# Patient Record
Sex: MALE | Race: OTHER | Employment: FULL TIME | URBAN - METROPOLITAN AREA
[De-identification: names, ages, dates, MRNs, and addresses within clinical notes are randomized per-mention and may not be internally consistent; named-entity substitution may affect disease eponyms.]

---

## 2023-11-30 ENCOUNTER — HOSPITAL ENCOUNTER (EMERGENCY)
Age: 23
Discharge: HOME | End: 2023-11-30
Attending: EMERGENCY MEDICINE

## 2023-11-30 ENCOUNTER — APPOINTMENT (OUTPATIENT)
Dept: GENERAL RADIOLOGY | Age: 23
End: 2023-11-30

## 2023-11-30 ENCOUNTER — APPOINTMENT (OUTPATIENT)
Dept: CT IMAGING | Age: 23
End: 2023-11-30

## 2023-11-30 VITALS
HEART RATE: 78 BPM | OXYGEN SATURATION: 100 % | TEMPERATURE: 99 F | BODY MASS INDEX: 22.87 KG/M2 | WEIGHT: 154.4 LBS | DIASTOLIC BLOOD PRESSURE: 77 MMHG | RESPIRATION RATE: 16 BRPM | HEIGHT: 69 IN | SYSTOLIC BLOOD PRESSURE: 148 MMHG

## 2023-11-30 DIAGNOSIS — S22.088A OTHER CLOSED FRACTURE OF TWELFTH THORACIC VERTEBRA, INITIAL ENCOUNTER (HCC): Primary | ICD-10-CM

## 2023-11-30 DIAGNOSIS — S32.018A OTHER CLOSED FRACTURE OF FIRST LUMBAR VERTEBRA, INITIAL ENCOUNTER (HCC): ICD-10-CM

## 2023-11-30 PROCEDURE — 72131 CT LUMBAR SPINE W/O DYE: CPT

## 2023-11-30 PROCEDURE — 99284 EMERGENCY DEPT VISIT MOD MDM: CPT

## 2023-11-30 RX ORDER — HYDROCODONE BITARTRATE AND ACETAMINOPHEN 5; 325 MG/1; MG/1
1 TABLET ORAL EVERY 8 HOURS PRN
Qty: 12 TABLET | Refills: 0 | Status: SHIPPED | OUTPATIENT
Start: 2023-11-30 | End: 2023-12-05

## 2023-11-30 ASSESSMENT — ENCOUNTER SYMPTOMS
GASTROINTESTINAL NEGATIVE: 1
RESPIRATORY NEGATIVE: 1
BACK PAIN: 1

## 2023-11-30 ASSESSMENT — PAIN - FUNCTIONAL ASSESSMENT: PAIN_FUNCTIONAL_ASSESSMENT: 0-10

## 2023-11-30 ASSESSMENT — PAIN DESCRIPTION - DESCRIPTORS: DESCRIPTORS: ACHING

## 2023-11-30 ASSESSMENT — PAIN SCALES - GENERAL: PAINLEVEL_OUTOF10: 10

## 2023-11-30 ASSESSMENT — PAIN DESCRIPTION - LOCATION: LOCATION: BACK

## 2023-11-30 ASSESSMENT — PAIN DESCRIPTION - ORIENTATION: ORIENTATION: LOWER

## 2023-12-01 NOTE — ED TRIAGE NOTES
Pt ambulatory to triage c/o falling off a roof on Tuesday while he was working. Pt states he fell about 10-20 feet, he hit his head, did not lose consciousness. Pt states he came in today because he back started to hurt. Denies any urinary or bowel changes. Able to walk with even, steady gait.

## 2023-12-01 NOTE — ED PROVIDER NOTES
Emergency Department Provider Note       PCP: No primary care provider on file. Age: 26 y.o. Sex: male     DISPOSITION Discharge - Pending Orders Complete 11/30/2023 08:43:29 PM       ICD-10-CM    1. Other closed fracture of twelfth thoracic vertebra, initial encounter ([address])  K62.060N HYDROcodone-acetaminophen ([address]) 5-325 MG per tablet     [address] (Spine Surgery)      2. Other closed fracture of first lumbar vertebra, initial encounter ([address])  S32.018A HYDROcodone-acetaminophen ([address]) 5-325 MG per tablet     [address] [place] (Spine Surgery)          Medical Decision Making     Complexity of Problems Addressed:  1 or more acute illnesses that pose a threat to life or bodily function. Data Reviewed and Analyzed:  I independently ordered and reviewed each unique test.      I interpreted the CT Scan agree with the radiologist interpretation. Discussion of management or test interpretation. Traumatic fall 10 feet off the roof onto grass. Fell flat on his back. Has had pain since. Has not been seen by anybody. No numbness or tingling. Given mechanism of injury will obtain CT lumbar for assessment. He has no abdominal pain. Low suspicion for dissection, intra-abdominal pathology. 8:46 PM  Continue with fracture of T12 and L1. No neurologic finding. Will place in TLSO spine. Will place on [place] as needed for pain. Recommend follow-up with orthopedics. Return precautions given. No further questions or concerns      Risk of Complications and/or Morbidity of Patient Management:  Prescription drug management performed. Shared medical decision making was utilized in creating the patients health plan today. History       HPI   Fell off roof, about 10 FT. Injury on Tuesday. Hitting the grass. No LOC. No head injury. Lower back pain. Pain with ambulation and movements. No incontinence. No numbness.    Review

## 2023-12-12 ENCOUNTER — OFFICE VISIT (OUTPATIENT)
Dept: ORTHOPEDIC SURGERY | Age: 23
End: 2023-12-12

## 2023-12-12 DIAGNOSIS — S32.010A COMPRESSION FRACTURE OF L1 VERTEBRA, INITIAL ENCOUNTER (HCC): ICD-10-CM

## 2023-12-12 DIAGNOSIS — S22.080A COMPRESSION FRACTURE OF T12 VERTEBRA, INITIAL ENCOUNTER (HCC): Primary | ICD-10-CM

## 2023-12-12 PROCEDURE — 99203 OFFICE O/P NEW LOW 30 MIN: CPT | Performed by: ORTHOPAEDIC SURGERY

## 2023-12-12 RX ORDER — HYDROCODONE BITARTRATE AND ACETAMINOPHEN 5; 325 MG/1; MG/1
1 TABLET ORAL EVERY 6 HOURS PRN
COMMUNITY

## 2023-12-12 NOTE — PROGRESS NOTES
Name: Rafita Durand Sport  YOB: 2000  Gender: male  MRN: [id number]  Age: 26 y.o. Chief Complaint: Back pain    History of Present Illness: This is a very pleasant 26 y.o. male who presents with back pain after a fall that occurred on 11/28/2023. Patient states he fell 10 feet. Went to the ER and CT was done that showed a compression fracture T12-L1. Denies any weakness in his legs, saddle anesthesia, difficulty going to the bathroom. Describes the pain as sharp and constant. Pain is 8/10 in severity. He has been wearing a TLSO which helps with his symptoms. Pain is worse with bending. Medications:       Current Outpatient Medications:     HYDROcodone-acetaminophen (NORCO) 5-325 MG per tablet, Take 1 tablet by mouth every 6 hours as needed for Pain. Max Daily Amount: 4 tablets, Disp: , Rfl:     Allergies:    No Known Allergies      Physical Exam:     This is a well developed well nourished male adult in no acute distress. Mood and affect are appropriate. Oriented to person, place, and time.     Respirations are unlabored and there is no evidence of cyanosis        Sensory testing:     L2 L3 L4 L5 S1 S2   Right 2 2 2 2 2 2   Left 2 2 2 2 2 2     0=absent; 1=altered; 2=normal; NT= not tested  Reflexes    Reflexes   Right Left   Quadriceps (L4) 2 2   Achilles (S1) 2 2     Strength testing in the lower extremity reveals the following based on the 5 point grading scale:     HF  (L2) KE (L3/4) ADF (L4) EHL (L5) APF (S1)   Right 5 5 5 5 5   Left 5 5 5 5 5   0=total paralysis; 1=palpable or visible contraction; 2= active movement with gravity eliminated; 3= active movement against gravity; 4= active movement against moderate resistance; 5= active movement against full resistance         Radiographic Studies:     X-rays including AP and lateral views of the lumbar spine were reviewed and interpreted:     X-rays of the thoracolumbar spine show compression fracture T12 and L1      CT of the

## 2024-01-10 ENCOUNTER — OFFICE VISIT (OUTPATIENT)
Dept: ORTHOPEDIC SURGERY | Age: 24
End: 2024-01-10

## 2024-01-10 DIAGNOSIS — S32.010A COMPRESSION FRACTURE OF L1 VERTEBRA, INITIAL ENCOUNTER (HCC): ICD-10-CM

## 2024-01-10 DIAGNOSIS — S22.080A COMPRESSION FRACTURE OF T12 VERTEBRA, INITIAL ENCOUNTER (HCC): Primary | ICD-10-CM

## 2024-01-10 PROCEDURE — 99213 OFFICE O/P EST LOW 20 MIN: CPT | Performed by: ORTHOPAEDIC SURGERY

## 2024-01-10 NOTE — PROGRESS NOTES
Name: Rafita Reddy  YOB: 2000  Gender: male  MRN: [id number]  Age: 23 y.o.      Chief Complaint: Fracture follow-up    History of Present Illness:      This is a very pleasant 23 y.o. male who presents for fracture follow-up.  Date of injury was November 30, 2023.  Patient has a T12 and L1 compression fracture.  Pain controlled.  Has been wearing his TLSO brace.        Medications:       Current Outpatient Medications:     HYDROcodone-acetaminophen (NORCO) 5-325 MG per tablet, Take 1 tablet by mouth every 6 hours as needed for Pain. Max Daily Amount: 4 tablets, Disp: , Rfl:     Allergies:    No Known Allergies      Physical Exam:     This is a well developed well nourished male adult in no acute distress.     Mood and affect are appropriate.    Oriented to person, place, and time.    Respirations are unlabored and there is no evidence of cyanosis      Sensory testing:     L2 L3 L4 L5 S1 S2   Right 2 2 2 2 2 2   Left 2 2 2 2 2 2     0=absent; 1=altered; 2=normal; NT= not tested  Reflexes    Reflexes   Right Left   Quadriceps (L4) 2 2   Achilles (S1) 2 2     Strength testing in the lower extremity reveals the following based on the 5 point grading scale:     HF  (L2) KE (L3/4) ADF (L4) EHL (L5) APF (S1)   Right 5 5 5 5 5   Left 5 5 5 5 5   0=total paralysis; 1=palpable or visible contraction; 2= active movement with gravity eliminated; 3= active movement against gravity; 4= active movement against moderate resistance; 5= active movement against full resistance         Radiographic Studies:     X-rays including AP and lateral views of the thoracic spine and thoracolumbar spine were reviewed and interpreted:     Fractures at T12 and L1 and stable ongoing compared to previous film    Diagnosis:      ICD-10-CM    1. Compression fracture of T12 vertebra, initial encounter ([facility])  S22.080A XR THORACOLUMBAR SPINE (MIN 2 VIEWS)     XR THORACIC SPINE FLEXION  AND EXTENSION ONLY      2. Compression fracture of L1

## 2024-02-07 ENCOUNTER — OFFICE VISIT (OUTPATIENT)
Dept: ORTHOPEDIC SURGERY | Age: 24
End: 2024-02-07

## 2024-02-07 DIAGNOSIS — S22.080A COMPRESSION FRACTURE OF T12 VERTEBRA, INITIAL ENCOUNTER (HCC): Primary | ICD-10-CM

## 2024-02-07 DIAGNOSIS — S32.010A COMPRESSION FRACTURE OF L1 VERTEBRA, INITIAL ENCOUNTER (HCC): ICD-10-CM

## 2024-02-07 PROCEDURE — 99213 OFFICE O/P EST LOW 20 MIN: CPT | Performed by: ORTHOPAEDIC SURGERY

## 2024-02-07 NOTE — PROGRESS NOTES
vertebra, initial encounter ([facility])  S32.010A XR LUMBAR SPINE FLEXION AND EXTENSION ONLY          Assessment/Plan:      -I discussed with the patient the diagnosis as well as the imaging findings.  At this time I recommend light duty at work with bending and lifting and twisting precautions with no greater than 15 pounds for 4 weeks.  Patient may return to work this upcoming Monday.  We will see the patient back in 4 weeks and then likely release him to full duty..  Patient no longer needs to wear the TLSO brace.  Patient was in agreement with the plan.  -Follow-up 4 weeks for repeat thoracolumbar x-rays    Ge Davies MD  Orthopaedic Spine Surgery     02/07/24  3:41 PM

## 2024-03-05 ENCOUNTER — OFFICE VISIT (OUTPATIENT)
Dept: ORTHOPEDIC SURGERY | Age: 24
End: 2024-03-05

## 2024-03-05 DIAGNOSIS — S32.010A COMPRESSION FRACTURE OF L1 VERTEBRA, INITIAL ENCOUNTER (HCC): ICD-10-CM

## 2024-03-05 DIAGNOSIS — S22.080A COMPRESSION FRACTURE OF T12 VERTEBRA, INITIAL ENCOUNTER (HCC): Primary | ICD-10-CM

## 2024-03-05 PROCEDURE — 99213 OFFICE O/P EST LOW 20 MIN: CPT | Performed by: ORTHOPAEDIC SURGERY

## 2024-03-05 NOTE — PROGRESS NOTES
Name: Rafita Reddy  YOB: 2000  Gender: male  MRN: [id number]  Age: 23 y.o.      Chief Complaint: Fracture follow-up    History of Present Illness:      This is a very pleasant 23 y.o. male who presents with a history of T12 and L1 fracture that occurred in November 2023.  He was treated in a brace and his symptoms have improved significantly.  He has minimal pain.  He is currently working light duty.        Medications:     No current outpatient medications on file.    Allergies:    No Known Allergies      Physical Exam:     This is a well developed well nourished male adult in no acute distress.     Mood and affect are appropriate.    Oriented to person, place, and time.    Respirations are unlabored and there is no evidence of cyanosis    The patient ambulates with an normal gait.     There is minimal hip irritability with internal or external rotation bilaterally.      Sensory testing:     L2 L3 L4 L5 S1 S2   Right 2 2 2 2 2 2   Left 2 2 2 2 2 2     0=absent; 1=altered; 2=normal; NT= not tested  Reflexes    Reflexes   Right Left   Quadriceps (L4) 2 2   Achilles (S1) 2 2     Strength testing in the lower extremity reveals the following based on the 5 point grading scale:     HF  (L2) KE (L3/4) ADF (L4) EHL (L5) APF (S1)   Right 5 5 5 5 5   Left 5 5 5 5 5   0=total paralysis; 1=palpable or visible contraction; 2= active movement with gravity eliminated; 3= active movement against gravity; 4= active movement against moderate resistance; 5= active movement against full resistance         Radiographic Studies:     X-rays including AP and lateral views of the lumbar spine were reviewed and interpreted:     Healed fractures at T12 and L1.    Diagnosis:      ICD-10-CM    1. Compression fracture of T12 vertebra, initial encounter ([facility])  S22.080A XR THORACOLUMBAR SPINE (MIN 2 VIEWS)      2. Compression fracture of L1 vertebra, initial encounter ([facility])  S32.010A XR THORACOLUMBAR SPINE (MIN 2 VIEWS)